# Patient Record
Sex: FEMALE | Race: WHITE | NOT HISPANIC OR LATINO
[De-identification: names, ages, dates, MRNs, and addresses within clinical notes are randomized per-mention and may not be internally consistent; named-entity substitution may affect disease eponyms.]

---

## 2019-04-20 ENCOUNTER — TRANSCRIPTION ENCOUNTER (OUTPATIENT)
Age: 59
End: 2019-04-20

## 2019-11-24 ENCOUNTER — TRANSCRIPTION ENCOUNTER (OUTPATIENT)
Age: 59
End: 2019-11-24

## 2020-01-20 ENCOUNTER — TRANSCRIPTION ENCOUNTER (OUTPATIENT)
Age: 60
End: 2020-01-20

## 2023-06-05 PROBLEM — Z00.00 ENCOUNTER FOR PREVENTIVE HEALTH EXAMINATION: Status: ACTIVE | Noted: 2023-06-05

## 2023-06-22 ENCOUNTER — APPOINTMENT (OUTPATIENT)
Dept: SURGICAL ONCOLOGY | Facility: CLINIC | Age: 63
End: 2023-06-22

## 2023-06-22 NOTE — ASSESSMENT
[FreeTextEntry1] : 62-year-old lady.\par \par Recently diagnosed 0.3 mm melanoma of her left arm.\par \par Oncologic diagnosis reviewed.\par \par For her preoperative assessment I recommended a chest x-ray.\par Prescription entered.\par \par I have asked her to call me a week after the imaging to discuss the results.\par \par \par If there are no imaging abnormalities, proper surgical management would consist of excision as an outpatient surgical procedure with a minimum 1 cm margin, coordinate with plastic surgery for reconstruction.\par \par Oncologic diagnosis, operative approach, risk, benefits, alternatives, possible surgical outcomes reviewed in detail, all questions answered.\par \par \par \par Note dictated to the referring physician

## 2023-06-22 NOTE — PHYSICAL EXAM
[Normal] : supple, no neck mass and thyroid not enlarged [Normal Neck Lymph Nodes] : normal neck lymph nodes  [Normal Supraclavicular Lymph Nodes] : normal supraclavicular lymph nodes [Normal Axillary Lymph Nodes] : normal axillary lymph nodes [Normal] : full range of motion and no deformities appreciated [de-identified] : Groins not examined [de-identified] : Below

## 2023-06-22 NOTE — HISTORY OF PRESENT ILLNESS
[de-identified] : 62-year-old lady.\par \par Referred by dermatology (Dr. John CARPENTER)\par \par CC:\par Recently diagnosed 0.3 mm (T1a) of his LEFT ARM (posterior/proximal)\par \par \par \par \par \par \par Derm: Dr. John CARPENTER.\par \par \par PMD: Dr. Benny STILL.\par \par NKDA.\par \par No pacemaker or defibrillator.\par No anticoagulants.\par \par + Hypercholesterolemia.\par Takes daily Lipitor.\par \par + Hypothyroid.\par Treated with Synthroid.

## 2023-06-22 NOTE — REASON FOR VISIT
[Initial Consultation] : an initial consultation for [FreeTextEntry2] : Recently diagnosed uncomplicated 0.3 mm melanoma of the left arm

## 2023-06-22 NOTE — REVIEW OF SYSTEMS
[Negative] : Heme/Lymph [FreeTextEntry5] : Hypercholesterolemia [de-identified] : Melanoma [de-identified] : Hypothyroid

## 2023-07-06 ENCOUNTER — APPOINTMENT (OUTPATIENT)
Dept: SURGICAL ONCOLOGY | Facility: CLINIC | Age: 63
End: 2023-07-06
Payer: COMMERCIAL

## 2023-07-06 VITALS
OXYGEN SATURATION: 96 % | SYSTOLIC BLOOD PRESSURE: 132 MMHG | RESPIRATION RATE: 14 BRPM | TEMPERATURE: 97.6 F | BODY MASS INDEX: 30.43 KG/M2 | DIASTOLIC BLOOD PRESSURE: 90 MMHG | HEIGHT: 60 IN | WEIGHT: 155 LBS | HEART RATE: 74 BPM

## 2023-07-06 PROCEDURE — 99205 OFFICE O/P NEW HI 60 MIN: CPT

## 2023-07-07 ENCOUNTER — OUTPATIENT (OUTPATIENT)
Dept: OUTPATIENT SERVICES | Facility: HOSPITAL | Age: 63
LOS: 1 days | End: 2023-07-07
Payer: COMMERCIAL

## 2023-07-07 DIAGNOSIS — C43.9 MALIGNANT MELANOMA OF SKIN, UNSPECIFIED: ICD-10-CM

## 2023-07-07 PROCEDURE — 88321 CONSLTJ&REPRT SLD PREP ELSWR: CPT

## 2023-07-09 RX ORDER — ATORVASTATIN CALCIUM 40 MG/1
40 TABLET, FILM COATED ORAL
Qty: 90 | Refills: 0 | Status: ACTIVE | COMMUNITY
Start: 2023-01-13

## 2023-07-09 NOTE — PHYSICAL EXAM
[FreeTextEntry1] : Left upper arm biopsy site noted and confirmed with the patient.  There is no residual pigmentation.\par No palpable left axillary adenopathy.

## 2023-07-09 NOTE — HISTORY OF PRESENT ILLNESS
[de-identified] : Ms. Regalado is a 63 y/o female whpo presented to dermatology with a left upper posterior arm pigmented lesion she noticed 6 months ago.\par Biopsy of the lesion 05/11/2023 demonstrated an invasive melanoma measuring 0.3 mm in thickness without ulceration, pT1a.\par She is referred for surgical treatment.\par This is her first melanoma.

## 2023-07-09 NOTE — ASSESSMENT
[FreeTextEntry1] : Left upper arm early melanoma.\par Discussed wide excision with 1 cm margins as definitive therapy.\par Risks/benefits/operative details explained.\par All questions answered.

## 2023-07-11 LAB — SURGICAL PATHOLOGY STUDY: SIGNIFICANT CHANGE UP

## 2023-08-01 ENCOUNTER — OUTPATIENT (OUTPATIENT)
Dept: OUTPATIENT SERVICES | Facility: HOSPITAL | Age: 63
LOS: 1 days | End: 2023-08-01
Payer: COMMERCIAL

## 2023-08-01 VITALS
RESPIRATION RATE: 18 BRPM | HEIGHT: 60 IN | DIASTOLIC BLOOD PRESSURE: 86 MMHG | SYSTOLIC BLOOD PRESSURE: 141 MMHG | HEART RATE: 74 BPM | WEIGHT: 160.06 LBS | OXYGEN SATURATION: 97 % | TEMPERATURE: 97 F

## 2023-08-01 DIAGNOSIS — C43.62 MALIGNANT MELANOMA OF LEFT UPPER LIMB, INCLUDING SHOULDER: ICD-10-CM

## 2023-08-01 DIAGNOSIS — E89.0 POSTPROCEDURAL HYPOTHYROIDISM: Chronic | ICD-10-CM

## 2023-08-01 DIAGNOSIS — E03.9 HYPOTHYROIDISM, UNSPECIFIED: ICD-10-CM

## 2023-08-01 LAB
ALBUMIN SERPL ELPH-MCNC: 5.3 G/DL — HIGH (ref 3.3–5)
ALP SERPL-CCNC: 57 U/L — SIGNIFICANT CHANGE UP (ref 40–120)
ALT FLD-CCNC: 32 U/L — SIGNIFICANT CHANGE UP (ref 4–33)
ANION GAP SERPL CALC-SCNC: 15 MMOL/L — HIGH (ref 7–14)
AST SERPL-CCNC: 32 U/L — SIGNIFICANT CHANGE UP (ref 4–32)
BILIRUB SERPL-MCNC: 0.4 MG/DL — SIGNIFICANT CHANGE UP (ref 0.2–1.2)
BUN SERPL-MCNC: 12 MG/DL — SIGNIFICANT CHANGE UP (ref 7–23)
CALCIUM SERPL-MCNC: 10.5 MG/DL — SIGNIFICANT CHANGE UP (ref 8.4–10.5)
CHLORIDE SERPL-SCNC: 100 MMOL/L — SIGNIFICANT CHANGE UP (ref 98–107)
CO2 SERPL-SCNC: 25 MMOL/L — SIGNIFICANT CHANGE UP (ref 22–31)
CREAT SERPL-MCNC: 0.67 MG/DL — SIGNIFICANT CHANGE UP (ref 0.5–1.3)
EGFR: 100 ML/MIN/1.73M2 — SIGNIFICANT CHANGE UP
GLUCOSE SERPL-MCNC: 92 MG/DL — SIGNIFICANT CHANGE UP (ref 70–99)
HCT VFR BLD CALC: 40.4 % — SIGNIFICANT CHANGE UP (ref 34.5–45)
HGB BLD-MCNC: 13.5 G/DL — SIGNIFICANT CHANGE UP (ref 11.5–15.5)
MCHC RBC-ENTMCNC: 30.1 PG — SIGNIFICANT CHANGE UP (ref 27–34)
MCHC RBC-ENTMCNC: 33.4 GM/DL — SIGNIFICANT CHANGE UP (ref 32–36)
MCV RBC AUTO: 90.2 FL — SIGNIFICANT CHANGE UP (ref 80–100)
NRBC # BLD: 0 /100 WBCS — SIGNIFICANT CHANGE UP (ref 0–0)
NRBC # FLD: 0 K/UL — SIGNIFICANT CHANGE UP (ref 0–0)
PLATELET # BLD AUTO: 342 K/UL — SIGNIFICANT CHANGE UP (ref 150–400)
POTASSIUM SERPL-MCNC: 4.1 MMOL/L — SIGNIFICANT CHANGE UP (ref 3.5–5.3)
POTASSIUM SERPL-SCNC: 4.1 MMOL/L — SIGNIFICANT CHANGE UP (ref 3.5–5.3)
PROT SERPL-MCNC: 8.5 G/DL — HIGH (ref 6–8.3)
RBC # BLD: 4.48 M/UL — SIGNIFICANT CHANGE UP (ref 3.8–5.2)
RBC # FLD: 12.5 % — SIGNIFICANT CHANGE UP (ref 10.3–14.5)
SODIUM SERPL-SCNC: 140 MMOL/L — SIGNIFICANT CHANGE UP (ref 135–145)
WBC # BLD: 7.09 K/UL — SIGNIFICANT CHANGE UP (ref 3.8–10.5)
WBC # FLD AUTO: 7.09 K/UL — SIGNIFICANT CHANGE UP (ref 3.8–10.5)

## 2023-08-01 PROCEDURE — 93010 ELECTROCARDIOGRAM REPORT: CPT

## 2023-08-01 NOTE — H&P PST ADULT - NSICDXPASTMEDICALHX_GEN_ALL_CORE_FT
PAST MEDICAL HISTORY:  Hypercholesteremia     Malignant melanoma of left upper limb, including shoulder     Nontoxic Uninodular Goiter     Osteoporosis      PAST MEDICAL HISTORY:  Hypercholesteremia     Malignant melanoma of left upper limb, including shoulder     Morbidly obese     Nontoxic Uninodular Goiter     Osteoporosis

## 2023-08-01 NOTE — H&P PST ADULT - PROBLEM SELECTOR PLAN 1
Patient tentatively scheduled for Excision of left arm melanoma on 8/10/23.  Pre-op instructions provided. Pt given verbal and written instructions with teach back on chlorhexidine shampoo and pepcid. Pt verbalized understanding with return demonstration.     CBC, CMP, E[facility].  No further evaluations requested.

## 2023-08-01 NOTE — H&P PST ADULT - NEGATIVE NEUROLOGICAL SYMPTOMS
no weakness/no paresthesias/no generalized seizures/no syncope/no tremors/no vertigo/no loss of sensation/no headache

## 2023-08-01 NOTE — H&P PST ADULT - REASON FOR ADMISSION
"I am having a removal of a melanoma of my left arm." "I am having a removal of a melanoma on my left arm."

## 2023-08-01 NOTE — H&P PST ADULT - NEGATIVE ENMT SYMPTOMS
no hearing difficulty/no ear pain/no tinnitus/no vertigo/no sinus symptoms/no nasal congestion/no dry mouth/no throat pain/no dysphagia no hearing difficulty/no ear pain/no tinnitus/no vertigo/no sinus symptoms/no nasal congestion/no nasal discharge/no dry mouth/no throat pain/no dysphagia

## 2023-08-01 NOTE — H&P PST ADULT - NEGATIVE OPHTHALMOLOGIC SYMPTOMS
wears reading glasses/no diplopia/no photophobia/no lacrimation L/no lacrimation R/no blurred vision L/no blurred vision R/no discharge L/no discharge R

## 2023-08-01 NOTE — H&P PST ADULT - HISTORY OF PRESENT ILLNESS
62 year old male with pmhx of Goiter s/p partial thyroidectomy, Osteoporosis, HLD, presents for pre-op evaluation for diagnosis of Malignant melanoma left upper limb including shoulder. Pt is scheduled for Excision of left arm melanoma.

## 2023-08-01 NOTE — H&P PST ADULT - NEGATIVE MUSCULOSKELETAL SYMPTOMS
no arthralgia/no arthritis/no myalgia/no muscle cramps/no muscle weakness/no stiffness/no neck pain/no back pain

## 2023-08-01 NOTE — H&P PST ADULT - FUNCTIONAL STATUS
METS 5- Able to climb up 2 flights of stairs, walk up hill, do heavy house chore without symptoms./4-10 METS

## 2023-08-09 ENCOUNTER — TRANSCRIPTION ENCOUNTER (OUTPATIENT)
Age: 63
End: 2023-08-09

## 2023-08-09 VITALS
TEMPERATURE: 98 F | OXYGEN SATURATION: 96 % | RESPIRATION RATE: 18 BRPM | WEIGHT: 160.06 LBS | HEART RATE: 60 BPM | HEIGHT: 60 IN | DIASTOLIC BLOOD PRESSURE: 85 MMHG | SYSTOLIC BLOOD PRESSURE: 143 MMHG

## 2023-08-09 NOTE — ASU PREOPERATIVE ASSESSMENT, ADULT (IPARK ONLY) - FALL HARM RISK - UNIVERSAL INTERVENTIONS
Bed in lowest position, wheels locked, appropriate side rails in place/Call bell, personal items and telephone in reach/Instruct patient to call for assistance before getting out of bed or chair/Non-slip footwear when patient is out of bed/[place] to call system/Physically safe environment - no spills, clutter or unnecessary equipment/Purposeful Proactive Rounding/Room/bathroom lighting operational, light cord in reach

## 2023-08-10 ENCOUNTER — OUTPATIENT (OUTPATIENT)
Dept: OUTPATIENT SERVICES | Facility: HOSPITAL | Age: 63
LOS: 1 days | Discharge: HOME | End: 2023-08-10
Payer: COMMERCIAL

## 2023-08-10 ENCOUNTER — RESULT REVIEW (OUTPATIENT)
Age: 63
End: 2023-08-10

## 2023-08-10 ENCOUNTER — TRANSCRIPTION ENCOUNTER (OUTPATIENT)
Age: 63
End: 2023-08-10

## 2023-08-10 ENCOUNTER — APPOINTMENT (OUTPATIENT)
Dept: SURGICAL ONCOLOGY | Facility: AMBULATORY SURGERY CENTER | Age: 63
End: 2023-08-10

## 2023-08-10 VITALS
RESPIRATION RATE: 16 BRPM | OXYGEN SATURATION: 98 % | HEART RATE: 55 BPM | SYSTOLIC BLOOD PRESSURE: 121 MMHG | DIASTOLIC BLOOD PRESSURE: 55 MMHG | TEMPERATURE: 97 F

## 2023-08-10 DIAGNOSIS — E89.0 POSTPROCEDURAL HYPOTHYROIDISM: Chronic | ICD-10-CM

## 2023-08-10 DIAGNOSIS — C43.62 MALIGNANT MELANOMA OF LEFT UPPER LIMB, INCLUDING SHOULDER: ICD-10-CM

## 2023-08-10 PROCEDURE — 88342 IMHCHEM/IMCYTCHM 1ST ANTB: CPT | Mod: 26

## 2023-08-10 PROCEDURE — 88305 TISSUE EXAM BY PATHOLOGIST: CPT | Mod: 26

## 2023-08-10 PROCEDURE — 11606 EXC TR-EXT MAL+MARG >4 CM: CPT

## 2023-08-10 RX ORDER — FAMOTIDINE 10 MG/ML
1 INJECTION INTRAVENOUS
Refills: 0 | DISCHARGE

## 2023-08-10 RX ORDER — LEVOTHYROXINE SODIUM 125 MCG
1 TABLET ORAL
Refills: 0 | DISCHARGE

## 2023-08-10 RX ORDER — UBIDECARENONE 100 MG
3 CAPSULE ORAL
Refills: 0 | DISCHARGE

## 2023-08-10 RX ORDER — ALENDRONATE SODIUM 70 MG/1
1 TABLET ORAL
Refills: 0 | DISCHARGE

## 2023-08-10 RX ORDER — ATORVASTATIN CALCIUM 80 MG/1
1 TABLET, FILM COATED ORAL
Refills: 0 | DISCHARGE

## 2023-08-10 NOTE — ASU DISCHARGE PLAN (ADULT/PEDIATRIC) - MEDICATION INSTRUCTIONS
Please take tylenol and ibuprofen, alternating one every 3 hours. Please be careful to avoid other medications containing acetaminophen while taking tylenol.

## 2023-08-10 NOTE — ASU DISCHARGE PLAN (ADULT/PEDIATRIC) - CARE PROVIDER_API CALL
Gerardo Barba-Yeou  Surgery  [address] [place] [id number]  Phone: [phone]  Fax: [phone]  Established Patient  Follow Up Time:

## 2023-08-10 NOTE — ASU DISCHARGE PLAN (ADULT/PEDIATRIC) - ACTIVITY LEVEL
avoid vigorous exercise or lifting heavy object with the affected arm until your follow up appointment

## 2023-08-11 PROBLEM — M81.0 AGE-RELATED OSTEOPOROSIS WITHOUT CURRENT PATHOLOGICAL FRACTURE: Chronic | Status: ACTIVE | Noted: 2023-08-01

## 2023-08-15 ENCOUNTER — OFFICE (OUTPATIENT)
Dept: URBAN - METROPOLITAN AREA CLINIC 112 | Facility: CLINIC | Age: 63
Setting detail: OPHTHALMOLOGY
End: 2023-08-15
Payer: COMMERCIAL

## 2023-08-15 ENCOUNTER — RX ONLY (RX ONLY)
Age: 63
End: 2023-08-15

## 2023-08-15 DIAGNOSIS — H25.13: ICD-10-CM

## 2023-08-15 DIAGNOSIS — H40.031: ICD-10-CM

## 2023-08-15 DIAGNOSIS — H40.033: ICD-10-CM

## 2023-08-15 DIAGNOSIS — H40.032: ICD-10-CM

## 2023-08-15 DIAGNOSIS — H35.013: ICD-10-CM

## 2023-08-15 DIAGNOSIS — H16.223: ICD-10-CM

## 2023-08-15 PROBLEM — H16.222 DRY EYE SYNDROME K SICCA; RIGHT EYE, LEFT EYE, BOTH EYES: Status: ACTIVE | Noted: 2023-08-15

## 2023-08-15 PROBLEM — H16.221 DRY EYE SYNDROME K SICCA; RIGHT EYE, LEFT EYE, BOTH EYES: Status: ACTIVE | Noted: 2023-08-15

## 2023-08-15 PROCEDURE — 92020 GONIOSCOPY: CPT | Performed by: OPHTHALMOLOGY

## 2023-08-15 PROCEDURE — 92250 FUNDUS PHOTOGRAPHY W/I&R: CPT | Performed by: OPHTHALMOLOGY

## 2023-08-15 PROCEDURE — 92014 COMPRE OPH EXAM EST PT 1/>: CPT | Performed by: OPHTHALMOLOGY

## 2023-08-15 ASSESSMENT — AXIALLENGTH_DERIVED
OS_AL: 22.0824
OD_AL: 22.5226
OD_AL: 22.5226
OS_AL: 21.8711

## 2023-08-15 ASSESSMENT — SPHEQUIV_DERIVED
OD_SPHEQUIV: 2.875
OD_SPHEQUIV: 2.875
OS_SPHEQUIV: 4.375
OS_SPHEQUIV: 5

## 2023-08-15 ASSESSMENT — REFRACTION_MANIFEST
OD_AXIS: 066
OS_VA2: 20/20
OD_VA2: 20/20
OS_AXIS: 085
OS_SPHERE: +4.75
OS_CYLINDER: -0.75
OD_ADD: +2.00
OD_SPHERE: +3.00
OD_VA1: 20/20
OD_CYLINDER: -0.25
OS_VA1: 20/25
OS_ADD: +2.00
OU_VA: 20/20

## 2023-08-15 ASSESSMENT — KERATOMETRY
OD_AXISANGLE_DEGREES: 114
OS_K1POWER_DIOPTERS: 43.00
OD_K1POWER_DIOPTERS: 43.00
OS_K2POWER_DIOPTERS: 43.50
OD_K2POWER_DIOPTERS: 44.00
OS_AXISANGLE_DEGREES: 037

## 2023-08-15 ASSESSMENT — TONOMETRY
OS_IOP_MMHG: 17
OD_IOP_MMHG: 16

## 2023-08-15 ASSESSMENT — REFRACTION_AUTOREFRACTION
OD_SPHERE: +3.25
OS_CYLINDER: -1.00
OD_CYLINDER: -0.75
OS_SPHERE: +5.50
OD_AXIS: 066
OS_AXIS: 083

## 2023-08-15 ASSESSMENT — CONFRONTATIONAL VISUAL FIELD TEST (CVF)
OS_FINDINGS: FULL
OD_FINDINGS: FULL

## 2023-08-15 ASSESSMENT — SUPERFICIAL PUNCTATE KERATITIS (SPK)
OS_SPK: T
OD_SPK: T

## 2023-08-15 ASSESSMENT — VISUAL ACUITY
OS_BCVA: 20/80-1
OD_BCVA: 20/100

## 2023-08-18 PROBLEM — C43.62 MALIGNANT MELANOMA OF LEFT UPPER LIMB, INCLUDING SHOULDER: Chronic | Status: ACTIVE | Noted: 2023-08-01

## 2023-08-18 PROBLEM — E78.00 PURE HYPERCHOLESTEROLEMIA, UNSPECIFIED: Chronic | Status: ACTIVE | Noted: 2023-08-01

## 2023-08-18 PROBLEM — E66.01 MORBID (SEVERE) OBESITY DUE TO EXCESS CALORIES: Chronic | Status: ACTIVE | Noted: 2023-08-01

## 2023-08-22 ENCOUNTER — APPOINTMENT (OUTPATIENT)
Dept: SURGICAL ONCOLOGY | Facility: CLINIC | Age: 63
End: 2023-08-22
Payer: COMMERCIAL

## 2023-08-22 VITALS
OXYGEN SATURATION: 98 % | SYSTOLIC BLOOD PRESSURE: 129 MMHG | DIASTOLIC BLOOD PRESSURE: 77 MMHG | HEART RATE: 65 BPM | RESPIRATION RATE: 16 BRPM | HEIGHT: 60 IN

## 2023-08-22 DIAGNOSIS — C43.62 MALIGNANT MELANOMA OF LEFT UPPER LIMB, INCLUDING SHOULDER: ICD-10-CM

## 2023-08-22 LAB — SURGICAL PATHOLOGY STUDY: SIGNIFICANT CHANGE UP

## 2023-08-22 PROCEDURE — 99024 POSTOP FOLLOW-UP VISIT: CPT

## 2023-08-22 NOTE — HISTORY OF PRESENT ILLNESS
[de-identified] : Ms. Regalado is a 61 y/o female initially seen in consultation on 7/6/23.  She presented to dermatology with a left upper posterior arm pigmented lesion she noticed 6 months prior. Biopsy of the lesion 05/11/2023 demonstrated an invasive melanoma measuring 0.3 mm in thickness without ulceration, pT1a. She is referred for surgical treatment. This is her first melanoma.  08/22/2023: She is now status post wide excision of left upper arm melanoma on 8/10/23.  She feels well and denies operative pain. Pathology: residual melanoma in-situ, margins clear.

## 2023-08-22 NOTE — REASON FOR VISIT
[Post-Op] : a post-op for [FreeTextEntry2] : status post wide excision of left upper arm melanoma 8/10/23

## 2023-08-22 NOTE — PHYSICAL EXAM
[FreeTextEntry1] : Left upper arm incision healing well without evidence of infection or recurrence.

## 2023-08-22 NOTE — ASSESSMENT
[FreeTextEntry1] : Left upper arm early melanoma. She is now status post wide excision of left upper arm melanoma on 8/10/23 No further treatment indicated for pT1a melanoma. Continue skin surveillance with dermatology.

## 2023-09-13 ENCOUNTER — OFFICE (OUTPATIENT)
Dept: URBAN - METROPOLITAN AREA CLINIC 94 | Facility: CLINIC | Age: 63
Setting detail: OPHTHALMOLOGY
End: 2023-09-13
Payer: COMMERCIAL

## 2023-09-13 ENCOUNTER — ASC (OUTPATIENT)
Dept: URBAN - METROPOLITAN AREA SURGERY 8 | Facility: SURGERY | Age: 63
Setting detail: OPHTHALMOLOGY
End: 2023-09-13
Payer: COMMERCIAL

## 2023-09-13 DIAGNOSIS — H16.223: ICD-10-CM

## 2023-09-13 DIAGNOSIS — H35.013: ICD-10-CM

## 2023-09-13 DIAGNOSIS — H40.031: ICD-10-CM

## 2023-09-13 DIAGNOSIS — H40.033: ICD-10-CM

## 2023-09-13 DIAGNOSIS — H25.13: ICD-10-CM

## 2023-09-13 DIAGNOSIS — H40.032: ICD-10-CM

## 2023-09-13 PROCEDURE — 66761 REVISION OF IRIS: CPT | Performed by: OPHTHALMOLOGY

## 2023-09-13 PROCEDURE — 99213 OFFICE O/P EST LOW 20 MIN: CPT | Performed by: OPHTHALMOLOGY

## 2023-09-13 PROCEDURE — 92133 CPTRZD OPH DX IMG PST SGM ON: CPT | Performed by: OPHTHALMOLOGY

## 2023-09-13 ASSESSMENT — AXIALLENGTH_DERIVED
OD_AL: 22.5645
OS_AL: 21.8687
OS_AL: 22.1227
OD_AL: 22.52

## 2023-09-13 ASSESSMENT — REFRACTION_MANIFEST
OS_VA2: 20/20
OD_ADD: +2.00
OS_ADD: +2.00
OD_VA2: 20/20
OS_AXIS: 085
OU_VA: 20/20
OS_CYLINDER: -0.75
OS_SPHERE: +4.75
OD_AXIS: 066
OD_CYLINDER: -0.25
OS_VA1: 20/25
OD_VA1: 20/20
OD_SPHERE: +3.00

## 2023-09-13 ASSESSMENT — VISUAL ACUITY
OD_BCVA: 20/150
OS_BCVA: 20/100-1

## 2023-09-13 ASSESSMENT — SPHEQUIV_DERIVED
OD_SPHEQUIV: 2.875
OS_SPHEQUIV: 5.125
OD_SPHEQUIV: 3
OS_SPHEQUIV: 4.375

## 2023-09-13 ASSESSMENT — SUPERFICIAL PUNCTATE KERATITIS (SPK)
OS_SPK: T
OD_SPK: T

## 2023-09-13 ASSESSMENT — REFRACTION_AUTOREFRACTION
OD_SPHERE: +3.50
OD_CYLINDER: -1.00
OS_CYLINDER: -0.75
OS_SPHERE: +5.50
OS_AXIS: 084
OD_AXIS: 076

## 2023-09-13 ASSESSMENT — KERATOMETRY
OD_AXISANGLE_DEGREES: 115
OS_AXISANGLE_DEGREES: 043
OD_K2POWER_DIOPTERS: 43.75
OS_K2POWER_DIOPTERS: 43.25
OD_K1POWER_DIOPTERS: 43.00
OS_K1POWER_DIOPTERS: 43.00

## 2023-09-13 ASSESSMENT — TONOMETRY
OS_IOP_MMHG: 17
OD_IOP_MMHG: 19

## 2023-09-13 ASSESSMENT — CONFRONTATIONAL VISUAL FIELD TEST (CVF)
OS_FINDINGS: FULL
OD_FINDINGS: FULL

## 2023-09-20 ENCOUNTER — ASC (OUTPATIENT)
Dept: URBAN - METROPOLITAN AREA SURGERY 8 | Facility: SURGERY | Age: 63
Setting detail: OPHTHALMOLOGY
End: 2023-09-20
Payer: COMMERCIAL

## 2023-09-20 DIAGNOSIS — H40.032: ICD-10-CM

## 2023-09-20 PROCEDURE — 66761 REVISION OF IRIS: CPT | Performed by: OPHTHALMOLOGY

## 2023-09-20 ASSESSMENT — REFRACTION_AUTOREFRACTION
OS_SPHERE: +5.50
OS_AXIS: 084
OD_AXIS: 076
OS_CYLINDER: -0.75
OD_SPHERE: +3.50
OD_CYLINDER: -1.00

## 2023-09-20 ASSESSMENT — KERATOMETRY
OD_K2POWER_DIOPTERS: 43.75
OS_K1POWER_DIOPTERS: 43.00
OD_AXISANGLE_DEGREES: 115
OD_K1POWER_DIOPTERS: 43.00
OS_K2POWER_DIOPTERS: 43.25
OS_AXISANGLE_DEGREES: 043

## 2023-09-20 ASSESSMENT — REFRACTION_MANIFEST
OD_AXIS: 066
OS_SPHERE: +4.75
OS_CYLINDER: -0.75
OS_VA1: 20/25
OU_VA: 20/20
OS_VA2: 20/20
OD_ADD: +2.00
OD_SPHERE: +3.00
OD_VA2: 20/20
OD_VA1: 20/20
OD_CYLINDER: -0.25
OS_AXIS: 085
OS_ADD: +2.00

## 2023-09-20 ASSESSMENT — AXIALLENGTH_DERIVED
OD_AL: 22.5645
OS_AL: 22.1227
OS_AL: 21.8687
OD_AL: 22.52

## 2023-09-20 ASSESSMENT — SPHEQUIV_DERIVED
OD_SPHEQUIV: 3
OD_SPHEQUIV: 2.875
OS_SPHEQUIV: 4.375
OS_SPHEQUIV: 5.125

## 2023-09-20 ASSESSMENT — VISUAL ACUITY
OS_BCVA: 20/100-1
OD_BCVA: 20/150

## 2023-09-20 ASSESSMENT — SUPERFICIAL PUNCTATE KERATITIS (SPK)
OD_SPK: T
OS_SPK: T

## 2023-09-27 ENCOUNTER — OFFICE (OUTPATIENT)
Dept: URBAN - METROPOLITAN AREA CLINIC 94 | Facility: CLINIC | Age: 63
Setting detail: OPHTHALMOLOGY
End: 2023-09-27
Payer: COMMERCIAL

## 2023-09-27 DIAGNOSIS — H25.13: ICD-10-CM

## 2023-09-27 PROBLEM — H40.033 NARROW ANGLE GLAUCOMA SUSPECT; ,, BOTH EYES: Status: ACTIVE | Noted: 2023-09-27

## 2023-09-27 PROCEDURE — 92012 INTRM OPH EXAM EST PATIENT: CPT | Performed by: OPHTHALMOLOGY

## 2023-09-27 ASSESSMENT — TONOMETRY
OD_IOP_MMHG: 14
OS_IOP_MMHG: 16
OS_IOP_MMHG: 14
OD_IOP_MMHG: 16

## 2023-09-27 ASSESSMENT — SUPERFICIAL PUNCTATE KERATITIS (SPK)
OS_SPK: T
OD_SPK: T

## 2023-09-28 ASSESSMENT — AXIALLENGTH_DERIVED
OS_AL: 21.913
OS_AL: 22.0824
OD_AL: 22.5251
OD_AL: 22.4808

## 2023-09-28 ASSESSMENT — REFRACTION_MANIFEST
OD_ADD: +2.00
OS_VA1: 20/25
OS_ADD: +2.00
OD_VA2: 20/20
OD_SPHERE: +3.00
OS_CYLINDER: -0.75
OS_SPHERE: +4.75
OU_VA: 20/20
OD_AXIS: 066
OD_VA1: 20/20
OS_AXIS: 085
OS_VA2: 20/20
OD_CYLINDER: -0.25

## 2023-09-28 ASSESSMENT — REFRACTION_AUTOREFRACTION
OD_CYLINDER: -1.00
OS_SPHERE: +5.25
OS_CYLINDER: -0.75
OS_AXIS: 088
OD_SPHERE: +3.25
OD_AXIS: 076

## 2023-09-28 ASSESSMENT — KERATOMETRY
OD_AXISANGLE_DEGREES: 110
OS_AXISANGLE_DEGREES: 036
OS_K2POWER_DIOPTERS: 43.50
OD_K2POWER_DIOPTERS: 44.00
OS_K1POWER_DIOPTERS: 43.00
OD_K1POWER_DIOPTERS: 43.25

## 2023-09-28 ASSESSMENT — SPHEQUIV_DERIVED
OS_SPHEQUIV: 4.875
OD_SPHEQUIV: 2.875
OS_SPHEQUIV: 4.375
OD_SPHEQUIV: 2.75

## 2023-09-28 ASSESSMENT — VISUAL ACUITY
OS_BCVA: 20/70-1
OD_BCVA: 20/200

## 2023-11-14 ENCOUNTER — OFFICE (OUTPATIENT)
Dept: URBAN - METROPOLITAN AREA CLINIC 114 | Facility: CLINIC | Age: 63
Setting detail: OPHTHALMOLOGY
End: 2023-11-14
Payer: COMMERCIAL

## 2023-11-14 DIAGNOSIS — H25.12: ICD-10-CM

## 2023-11-14 DIAGNOSIS — H35.013: ICD-10-CM

## 2023-11-14 DIAGNOSIS — H25.13: ICD-10-CM

## 2023-11-14 DIAGNOSIS — H40.033: ICD-10-CM

## 2023-11-14 PROCEDURE — 92136 OPHTHALMIC BIOMETRY: CPT | Mod: LT | Performed by: OPHTHALMOLOGY

## 2023-11-14 PROCEDURE — 92136 OPHTHALMIC BIOMETRY: CPT | Mod: TC | Performed by: OPHTHALMOLOGY

## 2023-11-14 PROCEDURE — 99213 OFFICE O/P EST LOW 20 MIN: CPT | Performed by: OPHTHALMOLOGY

## 2023-11-14 PROCEDURE — 92134 CPTRZ OPH DX IMG PST SGM RTA: CPT | Performed by: OPHTHALMOLOGY

## 2023-11-14 ASSESSMENT — SUPERFICIAL PUNCTATE KERATITIS (SPK)
OS_SPK: T
OD_SPK: T

## 2023-11-14 ASSESSMENT — CONFRONTATIONAL VISUAL FIELD TEST (CVF)
OD_FINDINGS: FULL
OS_FINDINGS: FULL

## 2023-11-15 PROBLEM — H25.11 CATARACT SENILE NUCLEAR SCLEROSIS; RIGHT EYE, LEFT EYE, BOTH EYES: Status: ACTIVE | Noted: 2023-11-14

## 2023-11-15 PROBLEM — H25.12 CATARACT SENILE NUCLEAR SCLEROSIS; RIGHT EYE, LEFT EYE, BOTH EYES: Status: ACTIVE | Noted: 2023-11-14

## 2023-11-15 PROBLEM — H25.13 CATARACT SENILE NUCLEAR SCLEROSIS; RIGHT EYE, LEFT EYE, BOTH EYES: Status: ACTIVE | Noted: 2023-11-14

## 2024-01-29 ASSESSMENT — REFRACTION_CURRENTRX
OD_CYLINDER: 0.00
OS_SPHERE: +3.25
OD_VPRISM_DIRECTION: SV
OD_OVR_VA: 20/
OD_AXIS: 000
OS_AXIS: 000
OS_VPRISM_DIRECTION: SV
OS_CYLINDER: 0.00
OS_OVR_VA: 20/
OD_SPHERE: +2.00

## 2024-01-29 ASSESSMENT — REFRACTION_AUTOREFRACTION
OD_AXIS: 064
OS_CYLINDER: -0.75
OD_CYLINDER: -0.75
OS_SPHERE: +5.50
OD_SPHERE: +3.25
OS_AXIS: 086

## 2024-01-29 ASSESSMENT — KERATOMETRY
OD_AXISANGLE_DEGREES: 090
OD_K1POWER_DIOPTERS: 43.25
OD_K2POWER_DIOPTERS: 44.00
OS_K2POWER_DIOPTERS: 43.00
OS_K1POWER_DIOPTERS: 42.75
OS_AXISANGLE_DEGREES: 080

## 2024-01-29 ASSESSMENT — REFRACTION_MANIFEST
OU_VA: 20/20
OD_CYLINDER: -0.25
OS_ADD: +2.00
OD_AXIS: 066
OS_AXIS: 085
OS_VA2: 20/20
OS_CYLINDER: -0.75
OD_VA1: 20/20
OS_SPHERE: +4.75
OD_VA2: 20/20
OD_SPHERE: +3.00
OS_VA1: 20/25
OD_ADD: +2.00

## 2024-01-29 ASSESSMENT — SPHEQUIV_DERIVED
OS_SPHEQUIV: 4.375
OD_SPHEQUIV: 2.875
OD_SPHEQUIV: 2.875
OS_SPHEQUIV: 5.125

## 2024-01-29 ASSESSMENT — AXIALLENGTH_DERIVED
OS_AL: 22.2037
OS_AL: 21.9479
OD_AL: 22.4808
OD_AL: 22.4808

## 2024-02-01 ENCOUNTER — ASC (OUTPATIENT)
Dept: URBAN - METROPOLITAN AREA SURGERY 8 | Facility: SURGERY | Age: 64
Setting detail: OPHTHALMOLOGY
End: 2024-02-01
Payer: COMMERCIAL

## 2024-02-01 DIAGNOSIS — H25.12: ICD-10-CM

## 2024-02-01 DIAGNOSIS — H52.212: ICD-10-CM

## 2024-02-01 PROCEDURE — 66984 XCAPSL CTRC RMVL W/O ECP: CPT | Mod: LT | Performed by: OPHTHALMOLOGY

## 2024-02-01 PROCEDURE — V2788P PANOPTIX: Performed by: OPHTHALMOLOGY

## 2024-02-01 PROCEDURE — FEMTO PRECISION LASER CATARACT SURGERY: Mod: GY | Performed by: OPHTHALMOLOGY

## 2024-02-02 ENCOUNTER — OFFICE (OUTPATIENT)
Dept: URBAN - METROPOLITAN AREA CLINIC 94 | Facility: CLINIC | Age: 64
Setting detail: OPHTHALMOLOGY
End: 2024-02-02
Payer: COMMERCIAL

## 2024-02-02 ENCOUNTER — RX ONLY (RX ONLY)
Age: 64
End: 2024-02-02

## 2024-02-02 DIAGNOSIS — Z96.1: ICD-10-CM

## 2024-02-02 PROCEDURE — 99024 POSTOP FOLLOW-UP VISIT: CPT | Performed by: PHYSICIAN ASSISTANT

## 2024-02-02 ASSESSMENT — SUPERFICIAL PUNCTATE KERATITIS (SPK)
OS_SPK: T
OD_SPK: T

## 2024-02-02 ASSESSMENT — REFRACTION_CURRENTRX
OS_OVR_VA: 20/
OD_CYLINDER: 0.00
OS_VPRISM_DIRECTION: SV
OD_OVR_VA: 20/
OS_AXIS: 000
OD_VPRISM_DIRECTION: SV
OD_AXIS: 000
OS_CYLINDER: 0.00
OS_SPHERE: +3.25
OD_SPHERE: +2.00

## 2024-02-02 ASSESSMENT — SPHEQUIV_DERIVED
OS_SPHEQUIV: 0.375
OD_SPHEQUIV: 2.875
OS_SPHEQUIV: 4.375
OD_SPHEQUIV: 2.875

## 2024-02-02 ASSESSMENT — REFRACTION_MANIFEST
OD_AXIS: 066
OD_VA2: 20/20
OD_CYLINDER: -0.25
OS_VA2: 20/20
OS_AXIS: 085
OD_VA1: 20/20
OS_SPHERE: +4.75
OD_ADD: +2.00
OD_SPHERE: +3.00
OS_CYLINDER: -0.75
OU_VA: 20/20
OS_ADD: +2.00
OS_VA1: 20/25

## 2024-02-02 ASSESSMENT — REFRACTION_AUTOREFRACTION
OD_SPHERE: +3.25
OD_AXIS: 064
OD_CYLINDER: -0.75
OS_SPHERE: +0.75
OS_CYLINDER: -0.75
OS_AXIS: 094

## 2024-02-02 ASSESSMENT — CONFRONTATIONAL VISUAL FIELD TEST (CVF)
OD_FINDINGS: FULL
OS_FINDINGS: FULL

## 2024-02-09 ENCOUNTER — OFFICE (OUTPATIENT)
Dept: URBAN - METROPOLITAN AREA CLINIC 94 | Facility: CLINIC | Age: 64
Setting detail: OPHTHALMOLOGY
End: 2024-02-09
Payer: COMMERCIAL

## 2024-02-09 DIAGNOSIS — Z96.1: ICD-10-CM

## 2024-02-09 DIAGNOSIS — H25.11: ICD-10-CM

## 2024-02-09 PROCEDURE — 92136 OPHTHALMIC BIOMETRY: CPT | Mod: RT | Performed by: PHYSICIAN ASSISTANT

## 2024-02-09 PROCEDURE — 99024 POSTOP FOLLOW-UP VISIT: CPT | Performed by: PHYSICIAN ASSISTANT

## 2024-02-09 ASSESSMENT — REFRACTION_MANIFEST
OD_VA2: 20/20
OS_SPHERE: +4.75
OD_ADD: +2.00
OS_CYLINDER: -0.75
OS_CYLINDER: -0.50
OD_SPHERE: +3.00
OD_VA1: 20/20
OS_ADD: +2.00
OS_VA2: 20/20
OS_VA1: 20/25
OS_SPHERE: -0.50
OS_AXIS: 085
OD_CYLINDER: -0.25
OS_VA1: 20/25
OU_VA: 20/20
OS_AXIS: 095
OD_AXIS: 066

## 2024-02-09 ASSESSMENT — REFRACTION_CURRENTRX
OS_OVR_VA: 20/
OD_VPRISM_DIRECTION: SV
OS_CYLINDER: 0.00
OS_AXIS: 000
OS_VPRISM_DIRECTION: SV
OD_AXIS: 000
OD_CYLINDER: 0.00
OD_SPHERE: +2.00
OD_OVR_VA: 20/
OS_SPHERE: +3.25

## 2024-02-09 ASSESSMENT — REFRACTION_AUTOREFRACTION
OS_AXIS: 091
OS_CYLINDER: -1.25
OS_SPHERE: +0.25
OD_CYLINDER: -0.75
OD_SPHERE: +3.25
OD_AXIS: 070

## 2024-02-09 ASSESSMENT — SPHEQUIV_DERIVED
OS_SPHEQUIV: 4.375
OS_SPHEQUIV: -0.375
OS_SPHEQUIV: -0.75
OD_SPHEQUIV: 2.875
OD_SPHEQUIV: 2.875

## 2024-02-09 ASSESSMENT — CONFRONTATIONAL VISUAL FIELD TEST (CVF)
OD_FINDINGS: FULL
OS_FINDINGS: FULL

## 2024-02-09 ASSESSMENT — SUPERFICIAL PUNCTATE KERATITIS (SPK)
OD_SPK: T
OS_SPK: T

## 2024-02-21 ENCOUNTER — ASC (OUTPATIENT)
Dept: URBAN - METROPOLITAN AREA SURGERY 8 | Facility: SURGERY | Age: 64
Setting detail: OPHTHALMOLOGY
End: 2024-02-21
Payer: COMMERCIAL

## 2024-02-21 DIAGNOSIS — H25.11: ICD-10-CM

## 2024-02-21 DIAGNOSIS — H52.211: ICD-10-CM

## 2024-02-21 PROCEDURE — FEMTO FEMTOSECOND LASER: Mod: GY | Performed by: OPHTHALMOLOGY

## 2024-02-21 PROCEDURE — 66984 XCAPSL CTRC RMVL W/O ECP: CPT | Mod: 79,RT | Performed by: OPHTHALMOLOGY

## 2024-02-21 PROCEDURE — V2788P PANOPTIX: Performed by: OPHTHALMOLOGY

## 2024-02-22 ENCOUNTER — RX ONLY (RX ONLY)
Age: 64
End: 2024-02-22

## 2024-02-22 ENCOUNTER — OFFICE (OUTPATIENT)
Dept: URBAN - METROPOLITAN AREA CLINIC 112 | Facility: CLINIC | Age: 64
Setting detail: OPHTHALMOLOGY
End: 2024-02-22
Payer: COMMERCIAL

## 2024-02-22 DIAGNOSIS — Z96.1: ICD-10-CM

## 2024-02-22 PROCEDURE — 99024 POSTOP FOLLOW-UP VISIT: CPT | Performed by: PHYSICIAN ASSISTANT

## 2024-02-22 ASSESSMENT — SPHEQUIV_DERIVED
OS_SPHEQUIV: 4.375
OS_SPHEQUIV: -0.125
OD_SPHEQUIV: 0.125
OD_SPHEQUIV: 2.875
OS_SPHEQUIV: -0.75

## 2024-02-22 ASSESSMENT — REFRACTION_AUTOREFRACTION
OD_CYLINDER: -1.25
OS_CYLINDER: -1.25
OS_AXIS: 090
OD_SPHERE: +0.75
OD_AXIS: 062
OS_SPHERE: +0.50

## 2024-02-22 ASSESSMENT — REFRACTION_MANIFEST
OD_VA1: 20/20
OD_CYLINDER: -0.25
OD_VA2: 20/20
OS_SPHERE: +4.75
OS_AXIS: 085
OS_VA2: 20/20
OD_SPHERE: +3.00
OS_ADD: +2.00
OS_VA1: 20/25
OD_AXIS: 066
OS_AXIS: 095
OD_ADD: +2.00
OU_VA: 20/20
OS_SPHERE: -0.50
OS_CYLINDER: -0.75
OS_VA1: 20/25
OS_CYLINDER: -0.50

## 2024-02-22 ASSESSMENT — CONFRONTATIONAL VISUAL FIELD TEST (CVF)
OS_FINDINGS: FULL
OD_FINDINGS: FULL

## 2024-02-22 ASSESSMENT — REFRACTION_CURRENTRX
OS_SPHERE: +3.25
OS_OVR_VA: 20/
OD_SPHERE: +2.00
OS_VPRISM_DIRECTION: SV
OD_CYLINDER: 0.00
OS_CYLINDER: 0.00
OS_AXIS: 000
OD_AXIS: 000
OD_OVR_VA: 20/
OD_VPRISM_DIRECTION: SV

## 2024-02-22 ASSESSMENT — SUPERFICIAL PUNCTATE KERATITIS (SPK)
OD_SPK: T
OS_SPK: T

## 2024-02-22 ASSESSMENT — CORNEAL EDEMA CLINICAL DESCRIPTION: OD_CORNEALEDEMA: T

## 2024-03-01 ENCOUNTER — OFFICE (OUTPATIENT)
Dept: URBAN - METROPOLITAN AREA CLINIC 94 | Facility: CLINIC | Age: 64
Setting detail: OPHTHALMOLOGY
End: 2024-03-01
Payer: COMMERCIAL

## 2024-03-01 DIAGNOSIS — Z96.1: ICD-10-CM

## 2024-03-01 PROBLEM — H26.492 POSTERIOR CAPSULAR OPACIFICATION; LEFT EYE: Status: ACTIVE | Noted: 2024-02-09

## 2024-03-01 PROCEDURE — 99024 POSTOP FOLLOW-UP VISIT: CPT | Performed by: PHYSICIAN ASSISTANT

## 2024-03-01 ASSESSMENT — REFRACTION_CURRENTRX
OS_OVR_VA: 20/
OS_VPRISM_DIRECTION: SV
OD_VPRISM_DIRECTION: SV
OS_CYLINDER: 0.00
OS_AXIS: 000
OD_AXIS: 000
OD_SPHERE: +2.00
OD_OVR_VA: 20/
OS_SPHERE: +3.25
OD_CYLINDER: 0.00

## 2024-03-01 ASSESSMENT — REFRACTION_MANIFEST
OD_SPHERE: +3.00
OS_SPHERE: -0.50
OD_VA2: 20/20
OS_AXIS: 095
OD_CYLINDER: -0.25
OD_VA1: 20/20
OU_VA: 20/20
OS_VA1: 20/25
OS_VA1: 20/25
OS_SPHERE: +4.75
OS_AXIS: 085
OS_ADD: +2.00
OD_AXIS: 066
OD_ADD: +2.00
OS_VA2: 20/20
OS_CYLINDER: -0.50
OS_CYLINDER: -0.75

## 2024-03-01 ASSESSMENT — SPHEQUIV_DERIVED
OS_SPHEQUIV: -0.75
OS_SPHEQUIV: 4.375
OD_SPHEQUIV: 2.875

## 2024-03-07 NOTE — H&P PST ADULT - TEMPERATURE IN CELSIUS (DEGREES C)
We received  refill request from [facility] for Ventolin inhaler. Next office visit is 1/9/25.  Looking in the chart Rx was just sent 1/11/24 to [facility].  I called Patience and asked her if she wants the prescription filled at [facility] or [facility].  She said that cost wise it would be better to get a 3 month supply from [facility].  Please advise.   
36.2

## 2024-04-05 ENCOUNTER — ASC (OUTPATIENT)
Dept: URBAN - METROPOLITAN AREA SURGERY 8 | Facility: SURGERY | Age: 64
Setting detail: OPHTHALMOLOGY
End: 2024-04-05
Payer: COMMERCIAL

## 2024-04-05 ENCOUNTER — OFFICE (OUTPATIENT)
Dept: URBAN - METROPOLITAN AREA CLINIC 94 | Facility: CLINIC | Age: 64
Setting detail: OPHTHALMOLOGY
End: 2024-04-05
Payer: COMMERCIAL

## 2024-04-05 DIAGNOSIS — H35.013: ICD-10-CM

## 2024-04-05 DIAGNOSIS — H26.492: ICD-10-CM

## 2024-04-05 DIAGNOSIS — H40.033: ICD-10-CM

## 2024-04-05 DIAGNOSIS — H16.223: ICD-10-CM

## 2024-04-05 PROCEDURE — 66821 AFTER CATARACT LASER SURGERY: CPT | Mod: 79,LT | Performed by: OPHTHALMOLOGY

## 2024-04-05 PROCEDURE — 99213 OFFICE O/P EST LOW 20 MIN: CPT | Mod: 24,57 | Performed by: OPHTHALMOLOGY

## 2024-04-09 ENCOUNTER — RX ONLY (RX ONLY)
Age: 64
End: 2024-04-09

## 2024-04-12 ENCOUNTER — ASC (OUTPATIENT)
Dept: URBAN - METROPOLITAN AREA SURGERY 8 | Facility: SURGERY | Age: 64
Setting detail: OPHTHALMOLOGY
End: 2024-04-12
Payer: COMMERCIAL

## 2024-04-12 DIAGNOSIS — H26.491: ICD-10-CM

## 2024-04-12 PROCEDURE — 66821 AFTER CATARACT LASER SURGERY: CPT | Mod: 79,RT | Performed by: OPHTHALMOLOGY

## 2024-04-26 ENCOUNTER — OFFICE (OUTPATIENT)
Dept: URBAN - METROPOLITAN AREA CLINIC 94 | Facility: CLINIC | Age: 64
Setting detail: OPHTHALMOLOGY
End: 2024-04-26
Payer: COMMERCIAL

## 2024-04-26 DIAGNOSIS — H26.492: ICD-10-CM

## 2024-04-26 DIAGNOSIS — Z96.1: ICD-10-CM

## 2024-04-26 DIAGNOSIS — H26.491: ICD-10-CM

## 2024-04-26 PROBLEM — H35.373 EPIRETINAL MEMBRANE; BOTH EYES: Status: ACTIVE | Noted: 2024-04-05

## 2024-04-26 PROCEDURE — 99024 POSTOP FOLLOW-UP VISIT: CPT | Performed by: PHYSICIAN ASSISTANT

## 2024-05-15 NOTE — H&P PST ADULT - HEIGHT IN FEET
Detail Level: Detailed Depth Of Biopsy: dermis Was A Bandage Applied: Yes Size Of Lesion In Cm: 0 Biopsy Type: H and E Biopsy Method: Personna blade Anesthesia Type: 2% lidocaine with epinephrine and a 1:10 solution of 8.4% sodium bicarbonate Anesthesia Volume In Cc: 1 Hemostasis: Aluminum Chloride 5 Wound Care: Petrolatum Dressing: bandage Destruction After The Procedure: No Type Of Destruction Used: Curettage Curettage Text: The wound bed was treated with curettage after the biopsy was performed. Cryotherapy Text: The wound bed was treated with cryotherapy after the biopsy was performed. Electrodesiccation Text: The wound bed was treated with electrodesiccation after the biopsy was performed. Electrodesiccation And Curettage Text: The wound bed was treated with electrodesiccation and curettage after the biopsy was performed. Silver Nitrate Text: The wound bed was treated with silver nitrate after the biopsy was performed. Lab: 6 Lab Facility: 3 Consent: Written consent was obtained and risks were reviewed including but not limited to scarring, infection, bleeding, scabbing, incomplete removal, nerve damage and allergy to anesthesia. Post-Care Instructions: I reviewed with the patient in detail post-care instructions. Patient is to keep the biopsy site dry overnight, and then apply aquaphor or vaseline twice daily until healed. Notification Instructions: Patient will be notified of biopsy results. However, patient instructed to call the office if not contacted within 2 weeks. Billing Type: Third-Party Bill Information: Selecting Yes will display possible errors in your note based on the variables you have selected. This validation is only offered as a suggestion for you. PLEASE NOTE THAT THE VALIDATION TEXT WILL BE REMOVED WHEN YOU FINALIZE YOUR NOTE. IF YOU WANT TO FAX A PRELIMINARY NOTE YOU WILL NEED TO TOGGLE THIS TO 'NO' IF YOU DO NOT WANT IT IN YOUR FAXED NOTE.

## 2024-05-24 ENCOUNTER — OFFICE (OUTPATIENT)
Dept: URBAN - METROPOLITAN AREA CLINIC 94 | Facility: CLINIC | Age: 64
Setting detail: OPHTHALMOLOGY
End: 2024-05-24
Payer: COMMERCIAL

## 2024-05-24 DIAGNOSIS — H02.834: ICD-10-CM

## 2024-05-24 DIAGNOSIS — H16.223: ICD-10-CM

## 2024-05-24 DIAGNOSIS — H02.423: ICD-10-CM

## 2024-05-24 DIAGNOSIS — H02.521: ICD-10-CM

## 2024-05-24 DIAGNOSIS — H02.831: ICD-10-CM

## 2024-05-24 PROBLEM — H02.524 BROW PTOSIS; RIGHT UPPER LID, LEFT UPPER LID: Status: ACTIVE | Noted: 2024-05-24

## 2024-05-24 PROBLEM — H02.422 PTOSIS MYOGENIC; RIGHT EYE, LEFT EYE, BOTH EYES: Status: ACTIVE | Noted: 2024-05-24

## 2024-05-24 PROBLEM — H02.421 PTOSIS MYOGENIC; RIGHT EYE, LEFT EYE, BOTH EYES: Status: ACTIVE | Noted: 2024-05-24

## 2024-05-24 PROCEDURE — 92082 INTERMEDIATE VISUAL FIELD XM: CPT | Performed by: OPHTHALMOLOGY

## 2024-05-24 PROCEDURE — 99214 OFFICE O/P EST MOD 30 MIN: CPT | Performed by: OPHTHALMOLOGY

## 2024-05-24 PROCEDURE — 92285 EXTERNAL OCULAR PHOTOGRAPHY: CPT | Performed by: OPHTHALMOLOGY

## 2024-05-24 PROCEDURE — 83861 MICROFLUID ANALY TEARS: CPT | Mod: QW | Performed by: OPHTHALMOLOGY

## 2024-05-24 ASSESSMENT — CONFRONTATIONAL VISUAL FIELD TEST (CVF)
OS_FINDINGS: FULL
OD_FINDINGS: FULL

## 2024-06-11 ASSESSMENT — REFRACTION_MANIFEST
OS_AXIS: 095
OS_AXIS: 085
OS_VA1: 20/25
OD_VA1: 20/20
OS_CYLINDER: -0.50
OS_CYLINDER: -0.75
OS_VA2: 20/20
OS_SPHERE: -0.50
OD_ADD: +2.00
OS_ADD: +2.00
OD_SPHERE: +3.00
OS_SPHERE: +4.75
OU_VA: 20/20
OD_CYLINDER: -0.25
OS_VA1: 20/25
OD_VA2: 20/20
OD_AXIS: 066

## 2024-06-11 ASSESSMENT — REFRACTION_CURRENTRX
OD_OVR_VA: 20/
OD_AXIS: 000
OS_OVR_VA: 20/
OD_CYLINDER: 0.00
OD_VPRISM_DIRECTION: SV
OS_SPHERE: +3.25
OD_SPHERE: +2.00
OS_AXIS: 000
OS_CYLINDER: 0.00
OS_VPRISM_DIRECTION: SV

## 2024-06-11 ASSESSMENT — KERATOMETRY
OD_K1POWER_DIOPTERS: 43.00
OS_K1POWER_DIOPTERS: 43.00
OS_AXISANGLE_DEGREES: 034
OD_K2POWER_DIOPTERS: 43.75
OD_AXISANGLE_DEGREES: 107
OS_K2POWER_DIOPTERS: 43.25
METHOD_AUTO_MANUAL: AUTO

## 2024-08-19 ENCOUNTER — ASC (OUTPATIENT)
Dept: URBAN - METROPOLITAN AREA SURGERY 8 | Facility: SURGERY | Age: 64
Setting detail: OPHTHALMOLOGY
End: 2024-08-19
Payer: COMMERCIAL

## 2024-08-19 DIAGNOSIS — H04.422: ICD-10-CM

## 2024-08-19 DIAGNOSIS — H02.524: ICD-10-CM

## 2024-08-19 DIAGNOSIS — H02.421: ICD-10-CM

## 2024-08-19 DIAGNOSIS — H02.521: ICD-10-CM

## 2024-08-19 PROCEDURE — 67900 REPAIR BROW DEFECT: CPT | Mod: 50 | Performed by: OPHTHALMOLOGY

## 2024-08-19 PROCEDURE — 67904 REPAIR EYELID DEFECT: CPT | Mod: 50 | Performed by: OPHTHALMOLOGY

## 2024-08-20 ENCOUNTER — OFFICE (OUTPATIENT)
Dept: URBAN - METROPOLITAN AREA CLINIC 116 | Facility: CLINIC | Age: 64
Setting detail: OPHTHALMOLOGY
End: 2024-08-20
Payer: COMMERCIAL

## 2024-08-20 ENCOUNTER — RX ONLY (RX ONLY)
Age: 64
End: 2024-08-20

## 2024-08-20 DIAGNOSIS — H02.423: ICD-10-CM

## 2024-08-20 DIAGNOSIS — H02.422: ICD-10-CM

## 2024-08-20 DIAGNOSIS — H02.834: ICD-10-CM

## 2024-08-20 DIAGNOSIS — H02.421: ICD-10-CM

## 2024-08-20 DIAGNOSIS — H02.831: ICD-10-CM

## 2024-08-20 DIAGNOSIS — H02.521: ICD-10-CM

## 2024-08-20 DIAGNOSIS — H02.524: ICD-10-CM

## 2024-08-20 PROCEDURE — 99024 POSTOP FOLLOW-UP VISIT: CPT | Performed by: PHYSICIAN ASSISTANT

## 2024-08-20 ASSESSMENT — CONFRONTATIONAL VISUAL FIELD TEST (CVF)
OS_FINDINGS: FULL
OD_FINDINGS: FULL

## 2024-09-03 ENCOUNTER — OFFICE (OUTPATIENT)
Dept: URBAN - METROPOLITAN AREA CLINIC 94 | Facility: CLINIC | Age: 64
Setting detail: OPHTHALMOLOGY
End: 2024-09-03
Payer: COMMERCIAL

## 2024-09-03 DIAGNOSIS — H02.421: ICD-10-CM

## 2024-09-03 DIAGNOSIS — H02.521: ICD-10-CM

## 2024-09-03 DIAGNOSIS — H02.831: ICD-10-CM

## 2024-09-03 DIAGNOSIS — H02.423: ICD-10-CM

## 2024-09-03 DIAGNOSIS — H02.834: ICD-10-CM

## 2024-09-03 DIAGNOSIS — H02.422: ICD-10-CM

## 2024-09-03 DIAGNOSIS — H02.524: ICD-10-CM

## 2024-09-03 PROCEDURE — 99024 POSTOP FOLLOW-UP VISIT: CPT | Performed by: OPHTHALMOLOGY

## 2024-09-03 ASSESSMENT — CONFRONTATIONAL VISUAL FIELD TEST (CVF)
OD_FINDINGS: FULL
OS_FINDINGS: FULL

## 2024-09-23 ENCOUNTER — OFFICE (OUTPATIENT)
Dept: URBAN - METROPOLITAN AREA CLINIC 112 | Facility: CLINIC | Age: 64
Setting detail: OPHTHALMOLOGY
End: 2024-09-23
Payer: COMMERCIAL

## 2024-09-23 DIAGNOSIS — H02.521: ICD-10-CM

## 2024-09-23 DIAGNOSIS — H02.422: ICD-10-CM

## 2024-09-23 DIAGNOSIS — H02.524: ICD-10-CM

## 2024-09-23 DIAGNOSIS — H02.423: ICD-10-CM

## 2024-09-23 DIAGNOSIS — H02.831: ICD-10-CM

## 2024-09-23 DIAGNOSIS — H02.834: ICD-10-CM

## 2024-09-23 DIAGNOSIS — H02.421: ICD-10-CM

## 2024-09-23 PROCEDURE — 99024 POSTOP FOLLOW-UP VISIT: CPT | Performed by: REGISTERED NURSE

## 2024-09-23 ASSESSMENT — CONFRONTATIONAL VISUAL FIELD TEST (CVF)
OS_FINDINGS: FULL
OD_FINDINGS: FULL

## 2025-05-07 ENCOUNTER — OFFICE (OUTPATIENT)
Dept: URBAN - METROPOLITAN AREA CLINIC 113 | Facility: CLINIC | Age: 65
Setting detail: OPHTHALMOLOGY
End: 2025-05-07
Payer: COMMERCIAL

## 2025-05-07 DIAGNOSIS — H35.373: ICD-10-CM

## 2025-05-07 DIAGNOSIS — H35.013: ICD-10-CM

## 2025-05-07 DIAGNOSIS — H43.813: ICD-10-CM

## 2025-05-07 PROCEDURE — 92250 FUNDUS PHOTOGRAPHY W/I&R: CPT | Performed by: OPHTHALMOLOGY

## 2025-05-07 PROCEDURE — 99214 OFFICE O/P EST MOD 30 MIN: CPT | Performed by: OPHTHALMOLOGY

## 2025-05-07 ASSESSMENT — SUPERFICIAL PUNCTATE KERATITIS (SPK)
OD_SPK: T
OS_SPK: T

## 2025-05-07 ASSESSMENT — REFRACTION_CURRENTRX
OD_VPRISM_DIRECTION: SV
OD_OVR_VA: 20/
OS_AXIS: 000
OS_CYLINDER: 0.00
OS_VPRISM_DIRECTION: SV
OD_CYLINDER: 0.00
OS_OVR_VA: 20/
OS_SPHERE: +3.25
OD_SPHERE: +2.00
OD_AXIS: 000

## 2025-05-07 ASSESSMENT — REFRACTION_AUTOREFRACTION
OS_SPHERE: PLANO
OS_CYLINDER: -0.50
OD_CYLINDER: -1.00
OD_SPHERE: +0.50
OD_AXIS: 063
OS_AXIS: 110

## 2025-05-07 ASSESSMENT — REFRACTION_MANIFEST
OD_SPHERE: +3.00
OS_CYLINDER: -0.75
OS_ADD: +2.00
OD_AXIS: 066
OS_VA2: 20/20
OS_VA1: 20/25
OD_VA1: 20/20
OS_CYLINDER: -0.50
OD_VA2: 20/20
OS_SPHERE: -0.50
OS_SPHERE: +4.75
OS_VA1: 20/25
OD_CYLINDER: -0.25
OS_AXIS: 085
OU_VA: 20/20
OD_ADD: +2.00
OS_AXIS: 095

## 2025-05-07 ASSESSMENT — KERATOMETRY
OS_K1POWER_DIOPTERS: 43.00
OD_K1POWER_DIOPTERS: 43.25
METHOD_AUTO_MANUAL: AUTO
OD_AXISANGLE_DEGREES: 120
OS_K2POWER_DIOPTERS: 43.50
OD_K2POWER_DIOPTERS: 44.00
OS_AXISANGLE_DEGREES: 033

## 2025-05-07 ASSESSMENT — TONOMETRY
OS_IOP_MMHG: 14
OD_IOP_MMHG: 12

## 2025-05-07 ASSESSMENT — CONFRONTATIONAL VISUAL FIELD TEST (CVF)
OS_FINDINGS: FULL
OD_FINDINGS: FULL

## 2025-05-07 ASSESSMENT — VISUAL ACUITY
OD_BCVA: 20/40-1
OS_BCVA: 20/25

## 2025-05-08 ENCOUNTER — OFFICE (OUTPATIENT)
Dept: URBAN - METROPOLITAN AREA CLINIC 88 | Facility: CLINIC | Age: 65
Setting detail: OPHTHALMOLOGY
End: 2025-05-08
Payer: COMMERCIAL

## 2025-05-08 DIAGNOSIS — H43.811: ICD-10-CM

## 2025-05-08 DIAGNOSIS — H35.3131: ICD-10-CM

## 2025-05-08 PROCEDURE — 92250 FUNDUS PHOTOGRAPHY W/I&R: CPT | Performed by: OPHTHALMOLOGY

## 2025-05-08 PROCEDURE — 92014 COMPRE OPH EXAM EST PT 1/>: CPT | Performed by: OPHTHALMOLOGY

## 2025-05-08 ASSESSMENT — REFRACTION_AUTOREFRACTION
OS_CYLINDER: -0.50
OD_AXIS: 063
OS_SPHERE: PLANO
OS_AXIS: 110
OD_CYLINDER: -1.00
OD_SPHERE: +0.50

## 2025-05-08 ASSESSMENT — KERATOMETRY
OD_AXISANGLE_DEGREES: 120
OS_K2POWER_DIOPTERS: 43.50
OS_K1POWER_DIOPTERS: 43.00
OD_K2POWER_DIOPTERS: 44.00
OS_AXISANGLE_DEGREES: 033
METHOD_AUTO_MANUAL: AUTO
OD_K1POWER_DIOPTERS: 43.25

## 2025-05-08 ASSESSMENT — VISUAL ACUITY
OD_BCVA: 20/40-1
OS_BCVA: 20/25

## 2025-05-08 ASSESSMENT — CONFRONTATIONAL VISUAL FIELD TEST (CVF)
OS_FINDINGS: FULL
OD_FINDINGS: FULL

## 2025-05-08 ASSESSMENT — TONOMETRY
OD_IOP_MMHG: 13
OS_IOP_MMHG: 13

## 2025-05-08 ASSESSMENT — SUPERFICIAL PUNCTATE KERATITIS (SPK)
OS_SPK: T
OD_SPK: T

## (undated) DEVICE — GLV 7.5 PROTEXIS (WHITE)

## (undated) DEVICE — DRAPE TOWEL BLUE 17" X 24"

## (undated) DEVICE — RADIOGRAPHY DVC SPEC TRANSPEC

## (undated) DEVICE — NDL HYPO SAFE 25G X 1" (ORANGE)

## (undated) DEVICE — ELCTR BOVIE TIP BLADE INSULATED 4" EDGE

## (undated) DEVICE — VENODYNE/SCD SLEEVE CALF MEDIUM

## (undated) DEVICE — SUT MONOCRYL 4-0 27" PS-2 UNDYED

## (undated) DEVICE — POSITIONER PATIENT SAFETY STRAP 3X60"

## (undated) DEVICE — DRSG TEGADERM 4X4.75"

## (undated) DEVICE — PACK MINOR NO DRAPE

## (undated) DEVICE — GOWN LG

## (undated) DEVICE — SUT SILK 2-0 30" SH

## (undated) DEVICE — LAP PAD W RING 18 X 18"

## (undated) DEVICE — DRSG STERISTRIPS 0.5 X 4"

## (undated) DEVICE — POSITIONER FOAM EGG CRATE ULNAR 2PCS (PINK)

## (undated) DEVICE — WARMING BLANKET LOWER ADULT

## (undated) DEVICE — SOL IRR POUR NS 0.9% 500ML

## (undated) DEVICE — GLV 8 PROTEXIS (WHITE)

## (undated) DEVICE — SOL IRR POUR H2O 500ML

## (undated) DEVICE — DRAPE LAPAROTOMY TRANSVERSE

## (undated) DEVICE — ELCTR GROUNDING PAD ADULT COVIDIEN

## (undated) DEVICE — SUT VICRYL 3-0 27" SH UNDYED

## (undated) DEVICE — DRSG MASTISOL

## (undated) DEVICE — ELCTR ROCKER SWITCH PENCIL BLUE 10FT